# Patient Record
Sex: MALE | Race: BLACK OR AFRICAN AMERICAN | NOT HISPANIC OR LATINO
[De-identification: names, ages, dates, MRNs, and addresses within clinical notes are randomized per-mention and may not be internally consistent; named-entity substitution may affect disease eponyms.]

---

## 2019-06-03 ENCOUNTER — APPOINTMENT (OUTPATIENT)
Dept: ORTHOPEDIC SURGERY | Facility: CLINIC | Age: 34
End: 2019-06-03
Payer: COMMERCIAL

## 2019-06-03 VITALS — WEIGHT: 185 LBS | BODY MASS INDEX: 21.4 KG/M2 | HEIGHT: 78 IN

## 2019-06-03 DIAGNOSIS — M75.81 OTHER SHOULDER LESIONS, RIGHT SHOULDER: ICD-10-CM

## 2019-06-03 DIAGNOSIS — M75.41 IMPINGEMENT SYNDROME OF RIGHT SHOULDER: ICD-10-CM

## 2019-06-03 PROBLEM — Z00.00 ENCOUNTER FOR PREVENTIVE HEALTH EXAMINATION: Status: ACTIVE | Noted: 2019-06-03

## 2019-06-03 PROCEDURE — 99204 OFFICE O/P NEW MOD 45 MIN: CPT | Mod: 25

## 2019-06-03 PROCEDURE — 20611 DRAIN/INJ JOINT/BURSA W/US: CPT | Mod: RT

## 2019-06-03 PROCEDURE — 76882 US LMTD JT/FCL EVL NVASC XTR: CPT | Mod: RT,59

## 2019-06-03 NOTE — PROCEDURE
[de-identified] : DIAGNOSTIC SONOGRAPHY of the Rotator Cuff Soft Tissue of the RIGHT SHOULDER was performed in Multiple Scan Planes with varying transducer frequencies.\par Imaging of the Supraspinatus Tendon reveals INFLAMMATION AND TENDONITIS WITH OUT SIGNIFICANT TEAR\par Imaging of the Biceps Tendon reveals no significant tear.\par Imaging of the Subscapularis Tendon reveals no significant tear.\par Imaging of the Infraspinatus Tendon reveals no significant tear.\par Key images were save digitally and reviewed with patient.\par \par \par Patient has demonstrated limited relief from NSAIDS, rest, exercises / PT, and after discussion of the risks and benefits, the patient has elected to proceed with an ULTRASOUND GUIDED injection into the RIGHT SUBACROMIAL  SPACE LATERAL APPROACH \par  \par Confirmed that the patient does not have history of prior adverse reactions, active, infections, or relevant allergies. There was no effusion, erythema, or warmth, and the skin was clear\par \par The skin was sterilized with alcohol. Ethyl Chloride was used as a topical anesthetic. Routine sterile technique. \par The site was injected UTILIZING ULTRASOUND GUIDANCE to confirm appropriate placement of the needle-\par with a mixture of medication and local anesthetic. The injection was completed without complication and a bandage was applied.\par  \par The patient tolerated the procedure well and was given post-injection instructions.Rec: Cold therapy, analgesics, avoid heavy activity.\par MEDICATION: 4cc of 1% xylocaine + 10mg of KENALOG\par \par

## 2019-06-03 NOTE — HISTORY OF PRESENT ILLNESS
[8] : an average pain level of 8/10 [Lifting] : worsened by lifting [Ice] : relieved by ice [NSAIDs] : relieved by nonsteroidal anti-inflammatory drugs [de-identified] : RIGHT SHOULDER PAIN\par JULY 2012\par FLARE UP WITH PLAYING VOLLEY BALL\par PAIN LEVEL 8/10\par LOCALIZED PAIN\par [facility]\par BETTER WITH ICE, MOTRIN\par WORSE WITH PLAYING VOLLEY BALL\par WEAKNESS

## 2019-06-03 NOTE — DISCUSSION/SUMMARY
[de-identified] : POST INJECTION INSTRUCTIONS\par \par COLD THERAPY , ANALGESICS PRN\par \par HOME STRETCHING AND EXERCISES QD\par \par START P.T.  2 WEEKS AFTER INJECTION \par \par MRI IF NO RELIEF\par

## 2019-06-03 NOTE — PHYSICAL EXAM
[de-identified] : PHYSICAL EXAM LEFT  SHOULDER\par \par NORMAL POSTURE / SCAPULAR PROTRACTION\par AROM 130 / 130 / 80 / 30\par TENDER: SA REGION / AC JOINT / GH JOINT / BICEPS GROOVE\par \par SPECIAL TESTING :\par PITTMAN - POSITIVE \par STEVE - POSITIVE \par SPEED TEST - POSITIVE\par \par FAIR - NEGATIVE \par APPREHENSION AND SUPPRESSION - NEGATIVE \par \par RC STRENGTH TESTING \par SS:  5/5\par SUB 5/5\par IS     5/5\par BICEPS  5/5\par \par SENSATION  - GROSSLY INTACT\par \par \par

## 2022-03-23 ENCOUNTER — APPOINTMENT (OUTPATIENT)
Dept: ORTHOPEDIC SURGERY | Facility: CLINIC | Age: 37
End: 2022-03-23

## 2022-03-23 DIAGNOSIS — S59.901A UNSPECIFIED INJURY OF RIGHT ELBOW, INITIAL ENCOUNTER: ICD-10-CM

## 2022-03-23 PROCEDURE — XXXXX: CPT | Mod: 1L

## 2022-03-25 NOTE — PHYSICAL EXAM
[de-identified] : PHYSICAL EXAM RIGHT ELBOW\par \par NO OBVIOUS DEFORMITY OR ANGULATION \par NO NOTABLE  REDNESS - SWELLING POSTERIOR\par \par AROM \par FLEXION / EXTENSION - 20 - 120 \par SUPINATION / PRONATION - WNL\par \par NO VARUS / VALGUS INSTABILITY \par \par TENDER AT THE OLECRANON \par \par MOTOR STRENGTH - GROSSLY NORMAL\par SENSATION - GROSSLY NORMAL\par \par  [de-identified] : SEE ATTACHED ER NOTE MARCH 17, 2022 - NO FRACTURE

## 2022-03-25 NOTE — DISCUSSION/SUMMARY
[de-identified] : NO SPORTS OR GYM\par \par COLD OR HEAT - YOUR CHOICE\par \par IBUPROFEN PRN\par \par SLING AS NEEDED \par \par MRI RIGHT ELBOW - R/O OCCULT FRACTURE OR GIA

## 2022-03-25 NOTE — HISTORY OF PRESENT ILLNESS
[de-identified] : RIGHT ELBOW PAIN \par PAIN STARTED FEBRUARY 28, 2022- MIGHT BE FROM PLAYING VOLLEY BALL \par FLARED UP MARCH 17, 2022\par PAIN LEVEL: 2/10 \par INTERMITTENT \par LIMITED ROM \par 3/17/2022-PT WAS GIVEN A SLING AT THE EMERGENCY ROOM AND DID X-RAYS [facility] \par REPORTS THAT BLOODY FLUID WAS DRAINED FROM ELBOW \par BETTER WITH COLD, HEAT \par WORSE WHEN EXTENDING ARM BACKWARDS, LIFTING , GRABBING, BENDING

## 2022-04-04 ENCOUNTER — APPOINTMENT (OUTPATIENT)
Dept: ORTHOPEDIC SURGERY | Facility: CLINIC | Age: 37
End: 2022-04-04

## 2022-04-04 DIAGNOSIS — S53.441A ULNAR COLLATERAL LIGAMENT SPRAIN OF RIGHT ELBOW, INITIAL ENCOUNTER: ICD-10-CM

## 2022-04-04 PROCEDURE — XXXXX: CPT | Mod: 1L

## 2022-04-04 NOTE — HISTORY OF PRESENT ILLNESS
[de-identified] : RIGHT ELBOW PAIN \par PAIN STARTED FEBRUARY 28, 2022- MIGHT BE FROM PLAYING VOLLEY BALL \par FLARED UP MARCH 17, 2022\par PAIN LEVEL: 1/10 \par INTERMITTENT \par LIMITED ROM \par 3/17/2022-PT WAS GIVEN A SLING AT THE EMERGENCY ROOM AND DID X-RAYS [facility] \par REPORTS THAT BLOODY FLUID WAS DRAINED FROM ELBOW \par BETTER WITH COLD, HEAT \par WORSE WHEN EXTENDING ARM BACKWARDS, LIFTING , GRABBING, BENDING

## 2022-04-04 NOTE — PHYSICAL EXAM
[de-identified] : 033/29/2022\par EXAM:  MRI RIGHT ELBOW WITHOUT CONTRAST\par IMPRESSION:  \par \par Moderate joint effusion and moderate synovitis.\par \par No erosions.\par \par Mild LUCL sprain.\par \par Thank you for the opportunity to participate in the care of this patient.

## 2024-02-18 ENCOUNTER — NON-APPOINTMENT (OUTPATIENT)
Age: 39
End: 2024-02-18

## 2024-06-08 ENCOUNTER — EMERGENCY (EMERGENCY)
Facility: HOSPITAL | Age: 39
LOS: 1 days | Discharge: HOME | End: 2024-06-08
Attending: EMERGENCY MEDICINE | Admitting: EMERGENCY MEDICINE
Payer: COMMERCIAL

## 2024-06-08 VITALS
WEIGHT: 198.42 LBS | DIASTOLIC BLOOD PRESSURE: 83 MMHG | OXYGEN SATURATION: 99 % | RESPIRATION RATE: 14 BRPM | HEART RATE: 80 BPM | TEMPERATURE: 98 F | HEIGHT: 78 IN | SYSTOLIC BLOOD PRESSURE: 125 MMHG

## 2024-06-08 LAB
ALBUMIN SERPL ELPH-MCNC: 3.8 G/DL — SIGNIFICANT CHANGE UP (ref 3.3–5)
ALP SERPL-CCNC: 81 U/L — SIGNIFICANT CHANGE UP (ref 30–120)
ALT FLD-CCNC: 18 U/L — SIGNIFICANT CHANGE UP (ref 10–60)
ANION GAP SERPL CALC-SCNC: 7 MMOL/L — SIGNIFICANT CHANGE UP (ref 5–17)
AST SERPL-CCNC: 17 U/L — SIGNIFICANT CHANGE UP (ref 10–40)
BASOPHILS # BLD AUTO: 0.03 K/UL — SIGNIFICANT CHANGE UP (ref 0–0.2)
BASOPHILS NFR BLD AUTO: 0.5 % — SIGNIFICANT CHANGE UP (ref 0–2)
BILIRUB SERPL-MCNC: 0.6 MG/DL — SIGNIFICANT CHANGE UP (ref 0.2–1.2)
BUN SERPL-MCNC: 21 MG/DL — SIGNIFICANT CHANGE UP (ref 7–23)
CALCIUM SERPL-MCNC: 8.7 MG/DL — SIGNIFICANT CHANGE UP (ref 8.4–10.5)
CHLORIDE SERPL-SCNC: 104 MMOL/L — SIGNIFICANT CHANGE UP (ref 96–108)
CO2 SERPL-SCNC: 28 MMOL/L — SIGNIFICANT CHANGE UP (ref 22–31)
CREAT SERPL-MCNC: 1.64 MG/DL — HIGH (ref 0.5–1.3)
EGFR: 54 ML/MIN/1.73M2 — LOW
EOSINOPHIL # BLD AUTO: 0.13 K/UL — SIGNIFICANT CHANGE UP (ref 0–0.5)
EOSINOPHIL NFR BLD AUTO: 2.2 % — SIGNIFICANT CHANGE UP (ref 0–6)
GLUCOSE SERPL-MCNC: 80 MG/DL — SIGNIFICANT CHANGE UP (ref 70–99)
HCT VFR BLD CALC: 41 % — SIGNIFICANT CHANGE UP (ref 39–50)
HGB BLD-MCNC: 13.8 G/DL — SIGNIFICANT CHANGE UP (ref 13–17)
IMM GRANULOCYTES NFR BLD AUTO: 0.2 % — SIGNIFICANT CHANGE UP (ref 0–0.9)
LYMPHOCYTES # BLD AUTO: 2.8 K/UL — SIGNIFICANT CHANGE UP (ref 1–3.3)
LYMPHOCYTES # BLD AUTO: 46.4 % — HIGH (ref 13–44)
MAGNESIUM SERPL-MCNC: 1.8 MG/DL — SIGNIFICANT CHANGE UP (ref 1.6–2.6)
MCHC RBC-ENTMCNC: 27.8 PG — SIGNIFICANT CHANGE UP (ref 27–34)
MCHC RBC-ENTMCNC: 33.7 GM/DL — SIGNIFICANT CHANGE UP (ref 32–36)
MCV RBC AUTO: 82.7 FL — SIGNIFICANT CHANGE UP (ref 80–100)
MONOCYTES # BLD AUTO: 0.67 K/UL — SIGNIFICANT CHANGE UP (ref 0–0.9)
MONOCYTES NFR BLD AUTO: 11.1 % — SIGNIFICANT CHANGE UP (ref 2–14)
NEUTROPHILS # BLD AUTO: 2.39 K/UL — SIGNIFICANT CHANGE UP (ref 1.8–7.4)
NEUTROPHILS NFR BLD AUTO: 39.6 % — LOW (ref 43–77)
NRBC # BLD: 0 /100 WBCS — SIGNIFICANT CHANGE UP (ref 0–0)
PLATELET # BLD AUTO: 186 K/UL — SIGNIFICANT CHANGE UP (ref 150–400)
POTASSIUM SERPL-MCNC: 4.6 MMOL/L — SIGNIFICANT CHANGE UP (ref 3.5–5.3)
POTASSIUM SERPL-SCNC: 4.6 MMOL/L — SIGNIFICANT CHANGE UP (ref 3.5–5.3)
PROT SERPL-MCNC: 6.9 G/DL — SIGNIFICANT CHANGE UP (ref 6–8.3)
RBC # BLD: 4.96 M/UL — SIGNIFICANT CHANGE UP (ref 4.2–5.8)
RBC # FLD: 13 % — SIGNIFICANT CHANGE UP (ref 10.3–14.5)
SODIUM SERPL-SCNC: 139 MMOL/L — SIGNIFICANT CHANGE UP (ref 135–145)
TROPONIN I, HIGH SENSITIVITY RESULT: <4 NG/L — SIGNIFICANT CHANGE UP
WBC # BLD: 6.03 K/UL — SIGNIFICANT CHANGE UP (ref 3.8–10.5)
WBC # FLD AUTO: 6.03 K/UL — SIGNIFICANT CHANGE UP (ref 3.8–10.5)

## 2024-06-08 PROCEDURE — 93005 ELECTROCARDIOGRAM TRACING: CPT

## 2024-06-08 PROCEDURE — 99285 EMERGENCY DEPT VISIT HI MDM: CPT

## 2024-06-08 PROCEDURE — 85025 COMPLETE CBC W/AUTO DIFF WBC: CPT

## 2024-06-08 PROCEDURE — 83735 ASSAY OF MAGNESIUM: CPT

## 2024-06-08 PROCEDURE — 93010 ELECTROCARDIOGRAM REPORT: CPT

## 2024-06-08 PROCEDURE — 71045 X-RAY EXAM CHEST 1 VIEW: CPT | Mod: 26

## 2024-06-08 PROCEDURE — 36415 COLL VENOUS BLD VENIPUNCTURE: CPT

## 2024-06-08 PROCEDURE — 71045 X-RAY EXAM CHEST 1 VIEW: CPT

## 2024-06-08 PROCEDURE — 80053 COMPREHEN METABOLIC PANEL: CPT

## 2024-06-08 PROCEDURE — 99285 EMERGENCY DEPT VISIT HI MDM: CPT | Mod: 25

## 2024-06-08 PROCEDURE — 84484 ASSAY OF TROPONIN QUANT: CPT

## 2024-06-08 RX ORDER — ASPIRIN/CALCIUM CARB/MAGNESIUM 324 MG
162 TABLET ORAL ONCE
Refills: 0 | Status: COMPLETED | OUTPATIENT
Start: 2024-06-08 | End: 2024-06-08

## 2024-06-08 RX ADMIN — Medication 162 MILLIGRAM(S): at 23:00

## 2024-06-08 NOTE — ED PROVIDER NOTE - NSFOLLOWUPINSTRUCTIONS_ED_ALL_ED_FT
1. Have you been to the ER, urgent care clinic since your last visit? Hospitalized since your last visit? No 
 
2. Have you seen or consulted any other health care providers outside of the [address] [place] [address] since your last visit? Include any pap smears or colon screening.  No 
 
 Cervical Radiculopathy  Close-up of the nerves of the cervical spine.  Cervical radiculopathy happens when a nerve in the neck (a cervical nerve) is pinched or bruised. This condition can happen because of an injury to the cervical spine (vertebrae) in the neck, or as part of the normal aging process. Pressure on the cervical nerves can cause pain or numbness that travels from the neck all the way down to the arm and fingers. This condition usually gets better with rest. Treatment may be needed if the condition does not improve.    What are the causes?  This condition may be caused by:  A neck injury.  A bulging (herniated) disk.  Muscle spasms.  Muscle tightness in the neck due to overuse.  Arthritis.  Breakdown or degeneration in the bones and joints of the spine (spondylosis) due to aging.  Bone spurs that may develop near the cervical nerves.  What are the signs or symptoms?  Symptoms of this condition include:  Pain. The pain may travel from the neck to the arm and hand. The pain can be severe or irritating. It may get worse when you move your neck.  Numbness or tingling in your arm or hand.  Weakness in the affected arm and hand, in severe cases.  How is this diagnosed?  This condition may be diagnosed based on your symptoms, your medical history, and a physical exam. You may also have tests, including:  X-rays.  CT scan.  MRI.  Electromyogram (EMG).  Nerve conduction tests.  How is this treated?  In many cases, treatment is not needed for this condition. With rest, the condition usually gets better over time. If treatment is needed, options may include:  Wearing a soft neck collar (cervical collar) for short periods of time.  Doing physical therapy to strengthen your neck muscles.  Taking medicines. These may include NSAIDs, such as ibuprofen, or oral corticosteroids.  Having spinal injections, in severe cases.  Having surgery. This may be needed if other treatments do not help. Different types of surgery may be done depending on the cause of this condition.  Follow these instructions at home:  If you have a cervical collar:    Wear it as told by your health care provider. Remove it only as told by your health care provider.  Ask your health care provider if you can remove the cervical collar for cleaning and bathing. If you are allowed to remove the collar for cleaning or bathing:  Follow instructions from your health care provider about how to remove the collar safely.  Clean the collar by wiping it with mild soap and water and drying it completely.  Take out any removable pads in the collar every 1–2 days, and wash them by hand with soap and water. Let them air-dry completely before you put them back in the collar.  Check your skin under the collar for irritation or sores. If you see any, tell your health care provider.  Managing pain    Bag of ice on a towel on the skin.   A heating pad for use on the painful area.  Take over-the-counter and prescription medicines only as told by your health care provider.  If directed, put ice on the affected area. To do this:  If you have a soft neck collar, remove it as told by your health care provider.  Put ice in a plastic bag.  Place a towel between your skin and the bag.  Leave the ice on for 20 minutes, 2–3 times a day.  Remove the ice if your skin turns bright red. This is very important. If you cannot feel pain, heat, or cold, you have a greater risk of damage to the area.  If applying ice does not help, you can try using heat. Use the heat source that your health care provider recommends, such as a moist heat pack or a heating pad.  Place a towel between your skin and the heat source.  Leave the heat on for 20–30 minutes.  Remove the heat if your skin turns bright red. This is especially important if you are unable to feel pain, heat, or cold. You have a greater risk of getting burned.  Try a gentle neck and shoulder massage to help relieve symptoms.  Activity    Rest as needed.  Return to your normal activities as told by your health care provider. Ask your health care provider what activities are safe for you.  Do stretching and strengthening exercises as told by your health care provider or your physical therapist.  You may have to avoid lifting. Ask your health care provider how much you can safely lift.  General instructions    Use a flat pillow when you sleep.  Do not drive while wearing a cervical collar. If you do not have a cervical collar, ask your health care provider if it is safe to drive while your neck heals.  Ask your health care provider if the medicine prescribed to you requires you to avoid driving or using machinery.  Do not use any products that contain nicotine or tobacco. These products include cigarettes, chewing tobacco, and vaping devices, such as e-cigarettes. If you need help quitting, ask your health care provider.  Keep all follow-up visits. This is important.  Contact a health care provider if:  Your condition does not improve with treatment.  Get help right away if:  Your pain gets much worse and is not controlled with medicines.  You have weakness or numbness in your hand, arm, face, or leg.  You have a high fever.  You have a stiff, rigid neck.  You lose control of your bowels or your bladder (have incontinence).  You have trouble with walking, balance, or speaking.  Summary  Cervical radiculopathy happens when a nerve in the neck is pinched or bruised.  A nerve can get pinched from a bulging disk, arthritis, muscle spasms, or an injury to the neck.  Symptoms include pain, tingling, or numbness radiating from the neck to the arm or hand. Weakness can also occur in severe cases.  Treatment may include rest, wearing a cervical collar, and physical therapy. Medicines may be prescribed to help with pain. In severe cases, injections or surgery may be needed.  This information is not intended to replace advice given to you by your health care provider. Make sure you discuss any questions you have with your health care provider.

## 2024-06-08 NOTE — ED ADULT NURSE NOTE - SUICIDE SCREENING QUESTION 1
Hello!  It was a pleasure to see you in clinic!  Thank you for getting labs done. Everything looks normal, which is good news.    Your cholesterol looks great. Your folic acid and B12 levels are normal. Your microalbumen is normal, which is great news. This means you do not have protein in the urine, and that your kidneys are currently functioning well. Your glucose level, electrolyte level and kidney function, measured with a test called the basic metabolic panel, is normal, which is great news!      Your PSA (prostate cancer screening blood test)  is normal (negative).    Your lab test to check for diabetes, HgA1C, also called glycosylated hemoglobin, which measures the level of sugar in your blood over the past few months, is slightly higher than normal but less than 6.5. This is good news, it means you don't have diabetes right now!  However, it does mean that you're at risk for developing diabetes in the future, so I recommend that we recheck this lab every year.  Getting regular exercise and eating a diet full of whole grains, fiber, fruits and vegetables with fewer carbohydrates, especially highly processed carbohydrates, such as sweets, white bread, rice, white pasta, potatoes, is the best way to keep your blood sugar low.          
No

## 2024-06-08 NOTE — ED ADULT NURSE NOTE - NSFALLUNIVINTERV_ED_ALL_ED
Bed/Stretcher in lowest position, wheels locked, appropriate side rails in place/Call bell, personal items and telephone in reach/Instruct patient to call for assistance before getting out of bed/chair/stretcher/Non-slip footwear applied when patient is off stretcher/[place] to call system/Physically safe environment - no spills, clutter or unnecessary equipment/Purposeful proactive rounding/Room/bathroom lighting operational, light cord in reach

## 2024-06-08 NOTE — ED PROVIDER NOTE - PROGRESS NOTE DETAILS
discussed results with pt and family - will provide copy, pain likely msk, hasn't seen pcp in a few years, advise f/u for routine check up

## 2024-06-08 NOTE — ED PROVIDER NOTE - CLINICAL SUMMARY MEDICAL DECISION MAKING FREE TEXT BOX
pt c/o left neck pain radiating into left arm started tonight while driving, atraumatic, will check 1 set enzymes/ekg/cxr, though more likely msk

## 2024-06-08 NOTE — ED PROVIDER NOTE - OBJECTIVE STATEMENT
38y M c/o pain left neck/shoulder into left arm, had been relaxing with family earlier, was driving home from the city, while driving developed this discomfort/tension neck to arm, doesn't feel muscular, denies heavy lifting/injury/exercise, never had this before, no sob, no n/v, no abd pain, no travel, no fever, no uri symptoms, no cough, non-smoker, no etoh

## 2024-06-08 NOTE — ED PROVIDER NOTE - PATIENT PORTAL LINK FT
You can access the FollowMyHealth Patient Portal offered by [facility] by registering at the following website: [email or web address]. By joining [employer]’s FollowMyHealth portal, you will also be able to view your health information using other applications (apps) compatible with our system.

## 2024-06-09 VITALS
TEMPERATURE: 98 F | OXYGEN SATURATION: 100 % | DIASTOLIC BLOOD PRESSURE: 65 MMHG | HEART RATE: 66 BPM | SYSTOLIC BLOOD PRESSURE: 104 MMHG | RESPIRATION RATE: 16 BRPM